# Patient Record
(demographics unavailable — no encounter records)

---

## 2025-01-16 NOTE — HISTORY OF PRESENT ILLNESS
[TextBox_4] : Mr. GAR is a 47-year-old male with a history of OW, COVID-19 x2 (3/2020, 12/2022), HTN, gout, former 10 pack year smoker, atrophic kidney s/p kidney removal, presenting to the office today for a follow-up pulmonary evaluation. His chief complaint is  -he notes exposure to his daughter with COVID 19 -he notes testing negative for COVID but developing a cough  -he notes feeling improved  -he notes diet is good -he notes appetite is stable -he notes chest pressure  -he notes occasional chest tightness  -he notes energy levels are 7/10  -he denies exercising -he notes needing to exercise again  -he notes sleeping for 6-7 hours  -he notes he is still on prednisone  -he notes he is planning to go on a cruise soon   -he denies any headaches, nausea, emesis, fever, chills, sweats, chest pain, wheezing, palpitations, diarrhea, constipation, dysphagia, vertigo, arthralgias, myalgias, leg swelling, itchy eyes, itchy ears, heartburn, reflux, or sour taste in the mouth.

## 2025-01-16 NOTE — PROCEDURE
[FreeTextEntry1] : 6 minute walk test reveals a low saturation of 87% with no evidence of dyspnea or fatigue; walked   440.1meters   Full PFT reveals moderate restrictive dysfunction; FEV1 was  2.02L which is 50% of predicted; moderately reduced lung volumes; moderate to severely reduced diffusion at ,10.7 which is 34% of predicted; normal flow volume loop. PFTs were performed to evaluate for SOB JUAN: MIP 86% and MEP 77%

## 2025-01-16 NOTE — ASSESSMENT
[FreeTextEntry1] : Mr. 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/  -?bronchospasm/RADS -cardiac disease  -doubtful, but at increased risk s/p COVID-19  problem 1: 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 vs other etiologies of ILDz, most likely due to COVID-19 12/2022, not from 3/2020 based on prior imaging c/w BOOP/ (s/p for Beckyvalerie 7/2023) *****Transplant consultation at [facility] discussed with patient (5/23/2024)************ -s/p Prednisone 40 mg x 30 days until next appointment, then taper off- s/p prednisone 20mg/7 wks -prednisone 20 mg even/15mg odd x 1 month, then 15mg/day x 1 month- move to 10 mg/day (1/8/2024)- 10 mg even days/5mg odd days x 2 months- move to 5 mg/day (5/2024) - move to 5 mg qoD x 6weeks then 3 mg qDay x 6 weeks  -s/p HRCT 10/2023- 10/2024 -s/p blood work to include: full rheumatologic panel- hypersensitivity panel, ESR level (+), ACE level -continue  mg q12H; Zithromax 500 mg x MWF -continue Pycnogenol supplement 150mg QD -continue Centelicum supplement 675mg QD -recommended Aerobika mucus clearing device -Etiology will depend on the causative agent possibilities include: idiopathic pulmonary fibrosis (UIP), NSIP (nonspecific interstitial pneumonia) , respiratory bronchiolitis in someone who is a smoker, drug induced lung disease, hypersensitivity pneumonitis, BOOP, sarcoidosis, chronic congestive heart failure, rheumatologic disorder induced interstitial lung disease. Optimal diagnosing will include rheumatological panel which would include ESR, MARIELA, ANCA, ARNP, CCP, rheumatoid factor, hypersensitivity panel, JOE1, scleroderma antibodies, Sjogren's syndrome antibodies; biopsy will be necessary to definitively determine the etiology unless the CT scan findings are specific for UIP. Therapy will be based on diagnostics.  Problem 1A: 2nd opinion for ILDz ([facility]) -s/p telomere testing (Border[facility]) -?cryobiopsy -consider transplant evaluation in progress -recommended pulmonary rehab  -Ofev 150mg BID in progress -complete yearly ECHO (PAH)  Problem 1B: prophylactic measure -Pepcid 40 mg QHS -s/p sulfamethoxazole 800-160 mg MWF -on Fosamax once per week  problem 1 C: JUAN weakness -recommended The Breather or Power Breath Medic -revealed by either reduction in patient's maximum inspiratory pressure (PI max) or maximum expiratory pressure (PE max), or both measured at the mouth. This can be related to a variety of medical issues such as neuromuscular disease, thyroid/parathyroid diseases, infections, poor nutrition, etc.  problem 2: ?bronchospasm/RADS (akin to asthma) -continue Symbicort 160 2 inhalations BID -add Spiriva at 2 inhalations QAM  -s/p Zithromax 500 mg x 7 days - continue Albuterol via inhaler 2 puffs up to Q6H, pre-exercise; Pulmicort 0.5% via nebulizer BID -recommended Aerobika mucus clearing device -Asthma is believed to be caused by inherited (genetic) and environmental factor, but its exact cause is unknown. Asthma may be triggered by allergens, lung infections, or irritants in the air. Asthma triggers are different for each person. -Inhaler technique reviewed as well as oral hygiene techniques reviewed with patient. Avoidance of cold air, extremes of temperature, rescue inhaler should be used before exercise. Order of medication reviewed with patient. Recommended use of a cool mist humidifier in the bedroom  Problem 3: Allergies/Sinus -Olopatadine 0.6% 1 sniff BID -s/p blood work to include: asthma panel (+), food IgE panel (-), IgE level (-), eosinophil level (-), vitamin D level (WNL) -Environmental measures for allergies were encouraged including mattress and pillow covers, air purifier, and environmental controls.  Problem 3: GERD -continue Omeprazole 20 mg QAM, pre-meal -continue Pepcid 40 mg QHS -Rule of 2s: avoid eating too much, eating too late, eating too spicy, eating two hours before bed. -Things to avoid including overeating, spicy foods, tight clothing, eating within three hours of bed, this list is not all inclusive. -For treatment of reflux, possible options discussed including diet control, H2 blockers, PPIs, as well as coating motility agents discussed as treatment options. Timing of meals and proximity of last meal to sleep were discussed. If symptoms persist, a formal gastrointestinal evaluation is needed.  Problem 4: (+) PATRICK (elevated Mallampati class, neck size >16)- mild -s/p home sleep study -continue to follow-up with Dr. Watts for an oral appliance -Sleep apnea is associated with adverse clinical consequences which can affect most organ systems. Cardiovascular disease risk includes arrhythmias, atrial fibrillation, hypertension, coronary artery disease, and stroke. Metabolic disorders include diabetes type 2, non-alcoholic fatty liver disease. Mood disorder especially depression; and cognitive decline especially in the elderly. Associations with chronic reflux/Barlow's esophagus some but not all inclusive. -Reasons include arousal consistent with hypopnea; respiratory events most prominent in REM sleep or supine position; therefore sleep staging and body position are important for accurate diagnosis and estimation of AHI.  problem 5: cardiac disease -recommended to continue to follow up with Cardiologist as needed  problem 6: poor breathing mechanics -Recommended Carol Medina and Butskylar breathing technique -Proper breathing techniques were reviewed with an emphasis of exhalation. Patient instructed to breathe in for 1 second and out for 4 seconds. Patient was encouraged to not talk while walking.  problem 7: overweight/ out of shape -recommended Berberine OTC supplement for visceral fat loss  -Weight loss, exercise, and diet control were discussed and are highly encouraged. Treatment options are given such as, aqua therapy, and contacting a nutritionist. Recommended to use the elliptical, stationary bike, less use of treadmill.  problem 8: health maintenance -recommended yearly flu shot after October 15, 2023 -pertussis vaccine given in office 10/25/2023 -recommended strep pneumonia vaccines: Prevnar-20 vaccine, followed by Pneumo vaccine 23 one year following after 65 years old. -recommended early intervention for Upper Respiratory Infections (URIs) -recommended regular osteoporosis evaluations -recommended early dermatological evaluations -recommended after the age of 50 to the age of 70, colonoscopy every 5 years  F/P in 4-6 months. He is encouraged to call with any changes, concerns, or questions.

## 2025-01-16 NOTE — ADDENDUM
[FreeTextEntry1] : Documented by Angelo Sotomayor acting as a scribe for Dr. Zachary Gonzales on 01/15/2025. All medical record entries made by the Scribe were at my, Dr. Zachary Gonzales's, direction and personally dictated by me on 01/15/2025. I have reviewed the chart and agree that the record accurately reflects my personal performance of the history, physical exam, assessment and plan. I have also personally directed, reviewed, and agree with the discharge instructions.

## 2025-01-16 NOTE — PHYSICAL EXAM
[No Acute Distress] : no acute distress [Normal Oropharynx] : normal oropharynx [III] : Mallampati Class: III [Normal Appearance] : normal appearance [No Neck Mass] : no neck mass [Normal Rate/Rhythm] : normal rate/rhythm [Normal S1, S2] : normal s1, s2 [No Murmurs] : no murmurs [No Resp Distress] : no resp distress [Clear to Auscultation Bilaterally] : clear to auscultation bilaterally [No Abnormalities] : no abnormalities [Benign] : benign [Normal Gait] : normal gait [No Clubbing] : no clubbing [No Cyanosis] : no cyanosis [No Edema] : no edema [FROM] : FROM [Normal Color/ Pigmentation] : normal color/ pigmentation [No Focal Deficits] : no focal deficits [Oriented x3] : oriented x3 [Normal Affect] : normal affect [TextBox_68] : I:E ratio 1:3; inspiratory crackles 1/2 way up bilaterally

## 2025-01-16 NOTE — ASSESSMENT
[FreeTextEntry1] : Mr. 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/  -?bronchospasm/RADS -cardiac disease  -doubtful, but at increased risk s/p COVID-19  problem 1: 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 vs other etiologies of ILDz, most likely due to COVID-19 12/2022, not from 3/2020 based on prior imaging c/w BOOP/ (s/p for Beckyvalerie 7/2023) *****Transplant consultation at [facility] discussed with patient (5/23/2024)************ -s/p Prednisone 40 mg x 30 days until next appointment, then taper off- s/p prednisone 20mg/7 wks -prednisone 20 mg even/15mg odd x 1 month, then 15mg/day x 1 month- move to 10 mg/day (1/8/2024)- 10 mg even days/5mg odd days x 2 months- move to 5 mg/day (5/2024) - move to 5 mg qoD x 6weeks then 3 mg qDay x 6 weeks  -s/p HRCT 10/2023- 10/2024 -s/p blood work to include: full rheumatologic panel- hypersensitivity panel, ESR level (+), ACE level -continue  mg q12H; Zithromax 500 mg x MWF -continue Pycnogenol supplement 150mg QD -continue Centelicum supplement 675mg QD -recommended Aerobika mucus clearing device -Etiology will depend on the causative agent possibilities include: idiopathic pulmonary fibrosis (UIP), NSIP (nonspecific interstitial pneumonia) , respiratory bronchiolitis in someone who is a smoker, drug induced lung disease, hypersensitivity pneumonitis, BOOP, sarcoidosis, chronic congestive heart failure, rheumatologic disorder induced interstitial lung disease. Optimal diagnosing will include rheumatological panel which would include ESR, MARIELA, ANCA, ARNP, CCP, rheumatoid factor, hypersensitivity panel, JOE1, scleroderma antibodies, Sjogren's syndrome antibodies; biopsy will be necessary to definitively determine the etiology unless the CT scan findings are specific for UIP. Therapy will be based on diagnostics.  Problem 1A: [facility] opinion for ILDz ([facility]) -s/p telomere testing ([facility]) -?cryobiopsy -consider transplant evaluation in progress -recommended pulmonary rehab  -Ofev 150mg BID in progress -complete yearly ECHO (PAH)  Problem 1B: prophylactic measure -Pepcid 40 mg QHS -s/p sulfamethoxazole 800-160 mg MWF -on Fosamax once per week  problem 1 C: JUAN weakness -recommended The Breather or Power Breath Medic -revealed by either reduction in patient's maximum inspiratory pressure (PI max) or maximum expiratory pressure (PE max), or both measured at the mouth. This can be related to a variety of medical issues such as neuromuscular disease, thyroid/parathyroid diseases, infections, poor nutrition, etc.  problem 2: ?bronchospasm/RADS (akin to asthma) -continue Symbicort 160 2 inhalations BID -add Spiriva at 2 inhalations QAM  -s/p Zithromax 500 mg x 7 days - continue Albuterol via inhaler 2 puffs up to Q6H, pre-exercise; Pulmicort 0.5% via nebulizer BID -recommended Aerobika mucus clearing device -Asthma is believed to be caused by inherited (genetic) and environmental factor, but its exact cause is unknown. Asthma may be triggered by allergens, lung infections, or irritants in the air. Asthma triggers are different for each person. -Inhaler technique reviewed as well as oral hygiene techniques reviewed with patient. Avoidance of cold air, extremes of temperature, rescue inhaler should be used before exercise. Order of medication reviewed with patient. Recommended use of a cool mist humidifier in the bedroom  Problem 3: Allergies/Sinus -Olopatadine 0.6% 1 sniff BID -s/p blood work to include: asthma panel (+), food IgE panel (-), IgE level (-), eosinophil level (-), vitamin D level (WNL) -Environmental measures for allergies were encouraged including mattress and pillow covers, air purifier, and environmental controls.  Problem 3: GERD -continue Omeprazole 20 mg QAM, pre-meal -continue Pepcid 40 mg QHS -Rule of 2s: avoid eating too much, eating too late, eating too spicy, eating two hours before bed. -Things to avoid including overeating, spicy foods, tight clothing, eating within three hours of bed, this list is not all inclusive. -For treatment of reflux, possible options discussed including diet control, H2 blockers, PPIs, as well as coating motility agents discussed as treatment options. Timing of meals and proximity of last meal to sleep were discussed. If symptoms persist, a formal gastrointestinal evaluation is needed.  Problem 4: (+) PATRICK (elevated Mallampati class, neck size >16)- mild -s/p home sleep study -continue to follow-up with Dr. Watts for an oral appliance -Sleep apnea is associated with adverse clinical consequences which can affect most organ systems. Cardiovascular disease risk includes arrhythmias, atrial fibrillation, hypertension, coronary artery disease, and stroke. Metabolic disorders include diabetes type 2, non-alcoholic fatty liver disease. Mood disorder especially depression; and cognitive decline especially in the elderly. Associations with chronic reflux/Barlow's esophagus some but not all inclusive. -Reasons include arousal consistent with hypopnea; respiratory events most prominent in REM sleep or supine position; therefore sleep staging and body position are important for accurate diagnosis and estimation of AHI.  problem 5: cardiac disease -recommended to continue to follow up with Cardiologist as needed  problem 6: poor breathing mechanics -Recommended Carol Medina and Butskylar breathing technique -Proper breathing techniques were reviewed with an emphasis of exhalation. Patient instructed to breathe in for 1 second and out for 4 seconds. Patient was encouraged to not talk while walking.  problem 7: overweight/ out of shape -recommended Berberine OTC supplement for visceral fat loss  -Weight loss, exercise, and diet control were discussed and are highly encouraged. Treatment options are given such as, aqua therapy, and contacting a nutritionist. Recommended to use the elliptical, stationary bike, less use of treadmill.  problem 8: health maintenance -recommended yearly flu shot after October 15, 2023 -pertussis vaccine given in office 10/25/2023 -recommended strep pneumonia vaccines: Prevnar-20 vaccine, followed by Pneumo vaccine 23 one year following after 65 years old. -recommended early intervention for Upper Respiratory Infections (URIs) -recommended regular osteoporosis evaluations -recommended early dermatological evaluations -recommended after the age of 50 to the age of 70, colonoscopy every 5 years  F/P in 4-6 months. He is encouraged to call with any changes, concerns, or questions.

## 2025-01-16 NOTE — REASON FOR VISIT
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